# Patient Record
Sex: FEMALE | Race: ASIAN | Employment: PART TIME | URBAN - METROPOLITAN AREA
[De-identification: names, ages, dates, MRNs, and addresses within clinical notes are randomized per-mention and may not be internally consistent; named-entity substitution may affect disease eponyms.]

---

## 2018-05-01 ENCOUNTER — OFFICE VISIT (OUTPATIENT)
Dept: FAMILY MEDICINE CLINIC | Age: 53
End: 2018-05-01

## 2018-05-01 VITALS
WEIGHT: 123.8 LBS | BODY MASS INDEX: 24.3 KG/M2 | HEIGHT: 60 IN | OXYGEN SATURATION: 99 % | DIASTOLIC BLOOD PRESSURE: 64 MMHG | TEMPERATURE: 97.7 F | HEART RATE: 59 BPM | SYSTOLIC BLOOD PRESSURE: 116 MMHG

## 2018-05-01 DIAGNOSIS — L50.1 CHRONIC IDIOPATHIC URTICARIA: ICD-10-CM

## 2018-05-01 DIAGNOSIS — J30.89 CHRONIC NONSEASONAL ALLERGIC RHINITIS DUE TO POLLEN: ICD-10-CM

## 2018-05-01 DIAGNOSIS — Z78.0 MENOPAUSE: ICD-10-CM

## 2018-05-01 DIAGNOSIS — E78.2 MIXED HYPERLIPIDEMIA: ICD-10-CM

## 2018-05-01 DIAGNOSIS — E55.9 VITAMIN D DEFICIENCY: ICD-10-CM

## 2018-05-01 DIAGNOSIS — Z00.00 ROUTINE GENERAL MEDICAL EXAMINATION AT A HEALTH CARE FACILITY: Primary | ICD-10-CM

## 2018-05-01 DIAGNOSIS — Z00.00 ROUTINE GENERAL MEDICAL EXAMINATION AT A HEALTH CARE FACILITY: ICD-10-CM

## 2018-05-01 LAB
A/G RATIO: 1.9 (ref 1.1–2.2)
ALBUMIN SERPL-MCNC: 4.6 G/DL (ref 3.4–5)
ALP BLD-CCNC: 72 U/L (ref 40–129)
ALT SERPL-CCNC: 12 U/L (ref 10–40)
ANION GAP SERPL CALCULATED.3IONS-SCNC: 11 MMOL/L (ref 3–16)
AST SERPL-CCNC: 20 U/L (ref 15–37)
BASOPHILS ABSOLUTE: 0.1 K/UL (ref 0–0.2)
BASOPHILS RELATIVE PERCENT: 1.3 %
BILIRUB SERPL-MCNC: 0.6 MG/DL (ref 0–1)
BUN BLDV-MCNC: 18 MG/DL (ref 7–20)
CALCIUM SERPL-MCNC: 9.3 MG/DL (ref 8.3–10.6)
CHLORIDE BLD-SCNC: 103 MMOL/L (ref 99–110)
CHOLESTEROL, TOTAL: 203 MG/DL (ref 0–199)
CO2: 27 MMOL/L (ref 21–32)
CREAT SERPL-MCNC: 0.6 MG/DL (ref 0.6–1.1)
EOSINOPHILS ABSOLUTE: 0.2 K/UL (ref 0–0.6)
EOSINOPHILS RELATIVE PERCENT: 2.4 %
GFR AFRICAN AMERICAN: >60
GFR NON-AFRICAN AMERICAN: >60
GLOBULIN: 2.4 G/DL
GLUCOSE BLD-MCNC: 92 MG/DL (ref 70–99)
HCT VFR BLD CALC: 46.1 % (ref 36–48)
HDLC SERPL-MCNC: 76 MG/DL (ref 40–60)
HEMOGLOBIN: 15.3 G/DL (ref 12–16)
LDL CHOLESTEROL CALCULATED: 110 MG/DL
LYMPHOCYTES ABSOLUTE: 2.8 K/UL (ref 1–5.1)
LYMPHOCYTES RELATIVE PERCENT: 39.6 %
MCH RBC QN AUTO: 31.4 PG (ref 26–34)
MCHC RBC AUTO-ENTMCNC: 33.3 G/DL (ref 31–36)
MCV RBC AUTO: 94.3 FL (ref 80–100)
MONOCYTES ABSOLUTE: 0.6 K/UL (ref 0–1.3)
MONOCYTES RELATIVE PERCENT: 8 %
NEUTROPHILS ABSOLUTE: 3.4 K/UL (ref 1.7–7.7)
NEUTROPHILS RELATIVE PERCENT: 48.7 %
PDW BLD-RTO: 13.3 % (ref 12.4–15.4)
PLATELET # BLD: 267 K/UL (ref 135–450)
PMV BLD AUTO: 8.2 FL (ref 5–10.5)
POTASSIUM SERPL-SCNC: 5 MMOL/L (ref 3.5–5.1)
RBC # BLD: 4.89 M/UL (ref 4–5.2)
SODIUM BLD-SCNC: 141 MMOL/L (ref 136–145)
TOTAL PROTEIN: 7 G/DL (ref 6.4–8.2)
TRIGL SERPL-MCNC: 83 MG/DL (ref 0–150)
TSH SERPL DL<=0.05 MIU/L-ACNC: 1.78 UIU/ML (ref 0.27–4.2)
VITAMIN D 25-HYDROXY: 33 NG/ML
VLDLC SERPL CALC-MCNC: 17 MG/DL
WBC # BLD: 7.1 K/UL (ref 4–11)

## 2018-05-01 PROCEDURE — 99396 PREV VISIT EST AGE 40-64: CPT | Performed by: FAMILY MEDICINE

## 2018-05-01 ASSESSMENT — PATIENT HEALTH QUESTIONNAIRE - PHQ9
1. LITTLE INTEREST OR PLEASURE IN DOING THINGS: 0
SUM OF ALL RESPONSES TO PHQ9 QUESTIONS 1 & 2: 0
2. FEELING DOWN, DEPRESSED OR HOPELESS: 0
SUM OF ALL RESPONSES TO PHQ QUESTIONS 1-9: 0

## 2018-05-01 ASSESSMENT — ENCOUNTER SYMPTOMS
GASTROINTESTINAL NEGATIVE: 1
EYES NEGATIVE: 1
ALLERGIC/IMMUNOLOGIC NEGATIVE: 1
RESPIRATORY NEGATIVE: 1

## 2018-09-24 ENCOUNTER — OFFICE VISIT (OUTPATIENT)
Dept: FAMILY MEDICINE CLINIC | Age: 53
End: 2018-09-24
Payer: COMMERCIAL

## 2018-09-24 VITALS
HEART RATE: 52 BPM | BODY MASS INDEX: 24.94 KG/M2 | SYSTOLIC BLOOD PRESSURE: 110 MMHG | DIASTOLIC BLOOD PRESSURE: 70 MMHG | WEIGHT: 127 LBS | TEMPERATURE: 97.4 F | HEIGHT: 60 IN | RESPIRATION RATE: 12 BRPM

## 2018-09-24 DIAGNOSIS — K59.00 CONSTIPATION, UNSPECIFIED CONSTIPATION TYPE: ICD-10-CM

## 2018-09-24 DIAGNOSIS — K92.1 HEMATOCHEZIA: Primary | ICD-10-CM

## 2018-09-24 PROCEDURE — 99213 OFFICE O/P EST LOW 20 MIN: CPT | Performed by: FAMILY MEDICINE

## 2018-10-11 ENCOUNTER — OFFICE VISIT (OUTPATIENT)
Dept: FAMILY MEDICINE CLINIC | Age: 53
End: 2018-10-11
Payer: COMMERCIAL

## 2018-10-11 VITALS
HEIGHT: 60 IN | WEIGHT: 125.6 LBS | SYSTOLIC BLOOD PRESSURE: 116 MMHG | OXYGEN SATURATION: 99 % | TEMPERATURE: 97.4 F | RESPIRATION RATE: 14 BRPM | BODY MASS INDEX: 24.66 KG/M2 | HEART RATE: 54 BPM | DIASTOLIC BLOOD PRESSURE: 74 MMHG

## 2018-10-11 DIAGNOSIS — R00.2 HEART PALPITATIONS: ICD-10-CM

## 2018-10-11 DIAGNOSIS — Z00.00 WELL ADULT HEALTH CHECK: Primary | ICD-10-CM

## 2018-10-11 DIAGNOSIS — Z12.39 SCREENING FOR BREAST CANCER: ICD-10-CM

## 2018-10-11 PROCEDURE — 99396 PREV VISIT EST AGE 40-64: CPT | Performed by: FAMILY MEDICINE

## 2018-10-11 PROCEDURE — 93000 ELECTROCARDIOGRAM COMPLETE: CPT | Performed by: FAMILY MEDICINE

## 2018-10-11 ASSESSMENT — ENCOUNTER SYMPTOMS
SINUS PRESSURE: 0
COLOR CHANGE: 0
DIARRHEA: 0
EYE REDNESS: 0
SHORTNESS OF BREATH: 0
SORE THROAT: 0
NAUSEA: 0
COUGH: 0
VOMITING: 0

## 2019-10-10 ENCOUNTER — HOSPITAL ENCOUNTER (OUTPATIENT)
Dept: MAMMOGRAPHY | Age: 54
Discharge: HOME | End: 2019-10-10
Payer: COMMERCIAL

## 2019-10-10 DIAGNOSIS — Z12.31 VISIT FOR SCREENING MAMMOGRAM: ICD-10-CM

## 2019-10-10 PROCEDURE — 77063 BREAST TOMOSYNTHESIS BI: CPT

## 2019-10-24 ENCOUNTER — OFFICE VISIT (OUTPATIENT)
Dept: FAMILY MEDICINE CLINIC | Age: 54
End: 2019-10-24
Payer: COMMERCIAL

## 2019-10-24 VITALS
TEMPERATURE: 97.8 F | WEIGHT: 126 LBS | HEART RATE: 48 BPM | SYSTOLIC BLOOD PRESSURE: 92 MMHG | OXYGEN SATURATION: 98 % | BODY MASS INDEX: 24.74 KG/M2 | DIASTOLIC BLOOD PRESSURE: 66 MMHG | HEIGHT: 60 IN | RESPIRATION RATE: 13 BRPM

## 2019-10-24 DIAGNOSIS — E78.2 MIXED HYPERLIPIDEMIA: ICD-10-CM

## 2019-10-24 DIAGNOSIS — Z00.00 WELL ADULT HEALTH CHECK: Primary | ICD-10-CM

## 2019-10-24 PROCEDURE — 99396 PREV VISIT EST AGE 40-64: CPT | Performed by: FAMILY MEDICINE

## 2019-10-24 ASSESSMENT — ENCOUNTER SYMPTOMS
COUGH: 0
SINUS PRESSURE: 0
NAUSEA: 0
DIARRHEA: 0
SHORTNESS OF BREATH: 0
SORE THROAT: 0
VOMITING: 0
EYE REDNESS: 0
COLOR CHANGE: 0

## 2019-10-24 ASSESSMENT — PATIENT HEALTH QUESTIONNAIRE - PHQ9
1. LITTLE INTEREST OR PLEASURE IN DOING THINGS: 0
SUM OF ALL RESPONSES TO PHQ9 QUESTIONS 1 & 2: 0
2. FEELING DOWN, DEPRESSED OR HOPELESS: 0
SUM OF ALL RESPONSES TO PHQ QUESTIONS 1-9: 0
SUM OF ALL RESPONSES TO PHQ QUESTIONS 1-9: 0

## 2020-11-05 ENCOUNTER — OFFICE VISIT (OUTPATIENT)
Dept: FAMILY MEDICINE CLINIC | Age: 55
End: 2020-11-05
Payer: COMMERCIAL

## 2020-11-05 VITALS
SYSTOLIC BLOOD PRESSURE: 118 MMHG | HEART RATE: 68 BPM | BODY MASS INDEX: 23.56 KG/M2 | DIASTOLIC BLOOD PRESSURE: 74 MMHG | TEMPERATURE: 97.5 F | WEIGHT: 120 LBS | HEIGHT: 60 IN | OXYGEN SATURATION: 98 %

## 2020-11-05 PROBLEM — Z80.3 FAMILY HISTORY OF BREAST CANCER IN MOTHER: Status: ACTIVE | Noted: 2020-11-05

## 2020-11-05 PROCEDURE — 99396 PREV VISIT EST AGE 40-64: CPT | Performed by: FAMILY MEDICINE

## 2020-11-05 ASSESSMENT — ENCOUNTER SYMPTOMS
COLOR CHANGE: 0
NAUSEA: 0
DIARRHEA: 0
EYE REDNESS: 0
SHORTNESS OF BREATH: 0
COUGH: 0
SORE THROAT: 0
VOMITING: 0
SINUS PRESSURE: 0

## 2020-11-05 ASSESSMENT — PATIENT HEALTH QUESTIONNAIRE - PHQ9
SUM OF ALL RESPONSES TO PHQ9 QUESTIONS 1 & 2: 0
SUM OF ALL RESPONSES TO PHQ QUESTIONS 1-9: 0
1. LITTLE INTEREST OR PLEASURE IN DOING THINGS: 0
SUM OF ALL RESPONSES TO PHQ QUESTIONS 1-9: 0
2. FEELING DOWN, DEPRESSED OR HOPELESS: 0
SUM OF ALL RESPONSES TO PHQ QUESTIONS 1-9: 0

## 2020-11-05 NOTE — PROGRESS NOTES
11/5/2020    Riki Lora is a 63 y.o. 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 Has PPI to take PRN. Also started apple cider vinegar. Perimenopause  - maybe one day of bleeding in past 6 months. Mood  - no problems. Dealing well with stress at work    Sleep  - better recently, changed to a new mattress    Review of Systems   Constitutional: Negative for chills and fever. HENT: Negative for congestion, sinus pressure and sore throat. Eyes: Negative for redness and visual disturbance. Respiratory: Negative for cough and shortness of breath. Cardiovascular: Negative for chest pain and leg swelling. Gastrointestinal: Negative for diarrhea, nausea and vomiting. Genitourinary: Negative for difficulty urinating. Skin: Negative for color change and rash. Neurological: Negative for dizziness and headaches. Psychiatric/Behavioral: Negative for confusion. Prior to Visit Medications    Medication Sig Taking? Authorizing Provider   Omega-3 Fatty Acids (FISH OIL) 1000 MG CAPS Take 3,000 mg by mouth 3 times daily. Yes Historical Provider, MD   Calcium-Vitamin D-Vitamin K (CALCIUM + D + K) [phone] MG-UNT-MCG TABS Take 1 tablet by mouth daily.  Yes Historical Provider, MD   zoster recombinant adjuvanted vaccine [facility]) 50 MCG SUSR injection Give vaccine as directed  Patient not taking: Reported on 10/24/2019  Renata Morales MD     Past Medical History:   Diagnosis Date    Allergic rhinitis     spring fall    Chicken pox     Chronic idiopathic urticaria 1/14/2015     Family History   Problem Relation Age of Onset    Heart Disease Mother         patend ductus repair    Diabetes Mother     High Blood Pressure Mother     High Cholesterol Mother     Arthritis Mother     Breast Cancer Mother 80    Diabetes Father     High Cholesterol Father     High Blood Pressure Father     Stroke Maternal Grandmother     Cancer Maternal Grandfather         colon    Psoriasis Brother        LABS:  Lab Results   Component Value Date     05/01/2018    K 5.0 05/01/2018    CREATININE 0.6 05/01/2018     Cholesterol, Total   Date Value Ref Range Status   05/01/2018 203 (H) 0 - 199 mg/dL Final     LDL Calculated   Date Value Ref Range Status   05/01/2018 110 (H) <100 mg/dL Final     HDL   Date Value Ref Range Status   05/01/2018 76 (H) 40 - 60 mg/dL Final   10/12/2010 66 mg/dL Final     Comment:     Please note that the reference ranges have been  changed to reflect ranges supported by the  American Heart Association. Female  High Risk          <35 - 45  Moderate Risk       45 - 55  Average Risk        55 - 63  Low Risk            60 - 75  Very Low Risk           >75  Note: This interpretation is independent of other  risk factors. Triglycerides   Date Value Ref Range Status   05/01/2018 83 0 - 150 mg/dL Final     Lab Results   Component Value Date    ALT 12 05/01/2018    AST 20 05/01/2018    ALKPHOS 72 05/01/2018    BILITOT 0.6 05/01/2018     Lab Results   Component Value Date    WBC 7.1 05/01/2018    HGB 15.3 05/01/2018    HCT 46.1 05/01/2018    MCV 94.3 05/01/2018     05/01/2018     No results found for: LABA1C     PHYSICAL EXAM  /74   Pulse 68   Temp 97.5 °F (36.4 °C)   Ht 5' (1.524 m)   Wt 120 lb (54.4 kg)   SpO2 98%   BMI 23.44 kg/m²     BP Readings from Last 5 Encounters:   11/05/20 118/74   10/24/19 92/66   10/11/18 116/74   09/24/18 110/70   05/01/18 116/64       Wt Readings from Last 5 Encounters:   11/05/20 120 lb (54.4 kg)   10/24/19 126 lb (57.2 kg)   10/11/18 125 lb 9.6 oz (57 kg)   09/24/18 127 lb (57.6 kg)   05/01/18 123 lb 12.8 oz (56.2 kg)        Physical Exam  Constitutional:       Appearance: She is well-developed. HENT:      Head: Normocephalic and atraumatic.       Right Ear: Tympanic membrane normal.      Left Ear: Tympanic membrane normal.   Eyes:      Conjunctiva/sclera: Conjunctivae normal.   Neck:      Musculoskeletal: Normal range of motion and neck supple. Thyroid: No thyromegaly. Cardiovascular:      Rate and Rhythm: Normal rate and regular rhythm. Heart sounds: Normal heart sounds. No murmur. Pulmonary:      Effort: Pulmonary effort is normal.      Breath sounds: Normal breath sounds. No wheezing. Abdominal:      General: Bowel sounds are normal.      Palpations: Abdomen is soft. There is no mass. Tenderness: There is no abdominal tenderness. Musculoskeletal: Normal range of motion. Lymphadenopathy:      Cervical: No cervical adenopathy. Skin:     General: Skin is warm and dry. Findings: No rash. Comments: Normal turgor   Neurological:      Mental Status: She is alert and oriented to person, place, and time. Deep Tendon Reflexes: Reflexes normal.   Psychiatric:         Thought Content: Thought content normal.       ASSESSMENT/PLAN:  1. Well adult health check  Reviewed healthy lifestyle, sleep, stress management, preventive medicine for age  - LIPID PANEL; Future  - Comprehensive Metabolic Panel; Future  - TSH WITH REFLEX TO FT4; Future    2.  Family history of breast cancer in mother - age 80      Return in about 1 year (around 11/5/2021) for physical.

## 2020-12-08 ENCOUNTER — HOSPITAL ENCOUNTER (OUTPATIENT)
Dept: MAMMOGRAPHY | Age: 55
Discharge: HOME | End: 2020-12-08
Payer: COMMERCIAL

## 2020-12-08 PROCEDURE — 77063 BREAST TOMOSYNTHESIS BI: CPT

## 2020-12-10 ENCOUNTER — OFFICE VISIT (OUTPATIENT)
Dept: PRIMARY CARE CLINIC | Age: 55
End: 2020-12-10
Payer: COMMERCIAL

## 2020-12-10 PROCEDURE — 99211 OFF/OP EST MAY X REQ PHY/QHP: CPT | Performed by: NURSE PRACTITIONER

## 2020-12-11 LAB — SARS-COV-2, NAA: NOT DETECTED

## 2021-02-01 ENCOUNTER — OFFICE VISIT (OUTPATIENT)
Dept: PRIMARY CARE CLINIC | Age: 56
End: 2021-02-01
Payer: COMMERCIAL

## 2021-02-01 DIAGNOSIS — Z20.822 SUSPECTED COVID-19 VIRUS INFECTION: Primary | ICD-10-CM

## 2021-02-01 PROCEDURE — 99211 OFF/OP EST MAY X REQ PHY/QHP: CPT | Performed by: NURSE PRACTITIONER

## 2021-02-01 NOTE — PROGRESS NOTES
Alexandra Mansfield received a viral test for COVID-19. They were educated on isolation and quarantine as appropriate. For any symptoms, they were directed to seek care from their PCP, given contact information to establish with a doctor, directed to an urgent care or the emergency room.

## 2021-02-02 LAB — SARS-COV-2, NAA: NOT DETECTED

## 2022-11-30 ENCOUNTER — HOSPITAL ENCOUNTER (OUTPATIENT)
Dept: MAMMOGRAPHY | Age: 57
Discharge: HOME | End: 2022-11-30
Payer: COMMERCIAL

## 2022-11-30 ENCOUNTER — OFFICE VISIT (OUTPATIENT)
Dept: FAMILY MEDICINE CLINIC | Age: 57
End: 2022-11-30
Payer: COMMERCIAL

## 2022-11-30 VITALS
DIASTOLIC BLOOD PRESSURE: 78 MMHG | WEIGHT: 126 LBS | TEMPERATURE: 98.1 F | HEIGHT: 60 IN | HEART RATE: 60 BPM | SYSTOLIC BLOOD PRESSURE: 108 MMHG | OXYGEN SATURATION: 98 % | BODY MASS INDEX: 24.74 KG/M2

## 2022-11-30 VITALS — BODY MASS INDEX: 24.74 KG/M2 | HEIGHT: 60 IN | WEIGHT: 126 LBS

## 2022-11-30 DIAGNOSIS — Z00.00 WELL ADULT HEALTH CHECK: ICD-10-CM

## 2022-11-30 DIAGNOSIS — Z12.31 VISIT FOR SCREENING MAMMOGRAM: ICD-10-CM

## 2022-11-30 DIAGNOSIS — Z82.0 FAMILY HISTORY OF ALZHEIMER'S DISEASE: ICD-10-CM

## 2022-11-30 DIAGNOSIS — Z00.00 WELL ADULT HEALTH CHECK: Primary | ICD-10-CM

## 2022-11-30 LAB
A/G RATIO: 1.7 (ref 1.1–2.2)
ALBUMIN SERPL-MCNC: 4.5 G/DL (ref 3.4–5)
ALP BLD-CCNC: 85 U/L (ref 40–129)
ALT SERPL-CCNC: 12 U/L (ref 10–40)
ANION GAP SERPL CALCULATED.3IONS-SCNC: 11 MMOL/L (ref 3–16)
AST SERPL-CCNC: 19 U/L (ref 15–37)
BILIRUB SERPL-MCNC: 0.5 MG/DL (ref 0–1)
BUN BLDV-MCNC: 16 MG/DL (ref 7–20)
CALCIUM SERPL-MCNC: 9.5 MG/DL (ref 8.3–10.6)
CHLORIDE BLD-SCNC: 103 MMOL/L (ref 99–110)
CHOLESTEROL, TOTAL: 235 MG/DL (ref 0–199)
CO2: 26 MMOL/L (ref 21–32)
CREAT SERPL-MCNC: 0.6 MG/DL (ref 0.6–1.1)
GFR SERPL CREATININE-BSD FRML MDRD: >60 ML/MIN/{1.73_M2}
GLUCOSE BLD-MCNC: 89 MG/DL (ref 70–99)
HDLC SERPL-MCNC: 62 MG/DL (ref 40–60)
LDL CHOLESTEROL CALCULATED: 150 MG/DL
POTASSIUM SERPL-SCNC: 4.5 MMOL/L (ref 3.5–5.1)
SODIUM BLD-SCNC: 140 MMOL/L (ref 136–145)
TOTAL PROTEIN: 7.1 G/DL (ref 6.4–8.2)
TRIGL SERPL-MCNC: 116 MG/DL (ref 0–150)
TSH SERPL DL<=0.05 MIU/L-ACNC: 2.11 UIU/ML (ref 0.27–4.2)
VITAMIN B-12: 393 PG/ML (ref 211–911)
VITAMIN D 25-HYDROXY: 55.1 NG/ML
VLDLC SERPL CALC-MCNC: 23 MG/DL

## 2022-11-30 PROCEDURE — 77067 SCR MAMMO BI INCL CAD: CPT

## 2022-11-30 PROCEDURE — 99396 PREV VISIT EST AGE 40-64: CPT | Performed by: FAMILY MEDICINE

## 2022-11-30 ASSESSMENT — PATIENT HEALTH QUESTIONNAIRE - PHQ9
1. LITTLE INTEREST OR PLEASURE IN DOING THINGS: 0
2. FEELING DOWN, DEPRESSED OR HOPELESS: 0
SUM OF ALL RESPONSES TO PHQ QUESTIONS 1-9: 0
SUM OF ALL RESPONSES TO PHQ9 QUESTIONS 1 & 2: 0

## 2022-11-30 NOTE — PROGRESS NOTES
11/30/2022    This is a 61 y.o. female   Chief Complaint   Patient presents with    Annual Exam     Yearly check    Other     Wanted to ask about weight gain, is postmenopausal and has put on a few lbs. Nasal Congestion     Runny nose and sore throat since yesterday, did Covid test was negative. HPI    Works as a Pediatrician at [employer]    Occasional word-finding and focus issues  Gets 5-7 hours of sleep per night  Walks the dog 3-4 x per week    Mom was diagnosed with Alzheimer's recently. She lives in [facility] [place] is from. This is increased stress    She is putting on a few pounds of weight. Since menopause has noticed redistribution of body weight more centrally    Review of Systems     Current Outpatient Medications   Medication Sig Dispense Refill    Omega-3 Fatty Acids (FISH OIL) 1000 MG CAPS Take 3,000 mg by mouth 3 times daily. Calcium-Vitamin D-Vitamin K (CALCIUM + D + K) [phone] MG-UNT-MCG TABS Take 1 tablet by mouth daily. zoster recombinant adjuvanted vaccine (SHINGRIX) 50 MCG SUSR injection Give vaccine as directed (Patient not taking: No sig reported) 1 each 0     No current facility-administered medications for this visit. /78 (Site: Right Upper Arm, Position: Sitting, Cuff Size: Medium Adult)   Pulse 60   Temp 98.1 °F (36.7 °C) (Oral)   Ht 5' (1.524 m)   Wt 126 lb (57.2 kg)   LMP 03/20/2018 (Approximate)   SpO2 98%   BMI 24.61 kg/m²     Physical Exam    Wt Readings from Last 3 Encounters:   11/30/22 126 lb (57.2 kg)   11/30/22 126 lb (57.2 kg)   11/05/20 120 lb (54.4 kg)     BP Readings from Last 3 Encounters:   11/30/22 108/78   11/05/20 118/74   10/24/19 92/66     Assessment/Plan:  1. Well adult health check  - Comprehensive Metabolic Panel; Future  - TSH; Future  - Lipid Panel; Future  - Vitamin D 25 Hydroxy; Future  - Vitamin B12; Future    2. Family history of Alzheimer's disease - mother age 80    Overall doing well. Reviewed healthy lifestyle. We discussed sleep, physical activity, stress reduction. We also discussed postmenopausal changes in body fat distribution.   We discussed her mother's Alzheimer's diagnosis    Return in about 1 year (around 11/30/2023) for physical.

## 2023-03-24 ENCOUNTER — OFFICE VISIT (OUTPATIENT)
Dept: FAMILY MEDICINE CLINIC | Age: 58
End: 2023-03-24
Payer: COMMERCIAL

## 2023-03-24 VITALS
HEART RATE: 69 BPM | HEIGHT: 60 IN | BODY MASS INDEX: 26.11 KG/M2 | OXYGEN SATURATION: 96 % | WEIGHT: 133 LBS | RESPIRATION RATE: 16 BRPM

## 2023-03-24 DIAGNOSIS — M54.42 ACUTE MIDLINE LOW BACK PAIN WITH LEFT-SIDED SCIATICA: Primary | ICD-10-CM

## 2023-03-24 PROCEDURE — 99213 OFFICE O/P EST LOW 20 MIN: CPT | Performed by: FAMILY MEDICINE

## 2023-03-24 RX ORDER — PREDNISONE 10 MG/1
TABLET ORAL
Qty: 20 TABLET | Refills: 0 | Status: SHIPPED | OUTPATIENT
Start: 2023-03-24

## 2023-03-24 SDOH — ECONOMIC STABILITY: FOOD INSECURITY: WITHIN THE PAST 12 MONTHS, THE FOOD YOU BOUGHT JUST DIDN'T LAST AND YOU DIDN'T HAVE MONEY TO GET MORE.: NEVER TRUE

## 2023-03-24 SDOH — ECONOMIC STABILITY: FOOD INSECURITY: WITHIN THE PAST 12 MONTHS, YOU WORRIED THAT YOUR FOOD WOULD RUN OUT BEFORE YOU GOT MONEY TO BUY MORE.: NEVER TRUE

## 2023-03-24 SDOH — ECONOMIC STABILITY: INCOME INSECURITY: HOW HARD IS IT FOR YOU TO PAY FOR THE VERY BASICS LIKE FOOD, HOUSING, MEDICAL CARE, AND HEATING?: NOT HARD AT ALL

## 2023-03-24 SDOH — ECONOMIC STABILITY: HOUSING INSECURITY
IN THE LAST 12 MONTHS, WAS THERE A TIME WHEN YOU DID NOT HAVE A STEADY PLACE TO SLEEP OR SLEPT IN A SHELTER (INCLUDING NOW)?: NO

## 2023-03-24 NOTE — PROGRESS NOTES
days, 1 tab for 2 days  Dispense: 20 tablet; Refill: 0  - [facility]    Symptoms consistent with a possible slipped disc. Treat with prednisone taper, since this is a recurrence after initial recovery a week or so ago, advised physical therapy.     Starting to show some signs of possible weakness in the left leg based on history and exam.  We discussed if symptoms are not improving with prednisone and physical therapy or if noticing any progressive weakness or strength changes we would proceed with an MRI lumbar spine

## 2023-03-29 ENCOUNTER — HOSPITAL ENCOUNTER (OUTPATIENT)
Dept: PHYSICAL THERAPY | Age: 58
Setting detail: THERAPIES SERIES
Discharge: HOME | End: 2023-03-29
Payer: COMMERCIAL

## 2023-03-29 PROCEDURE — 97110 THERAPEUTIC EXERCISES: CPT

## 2023-03-29 PROCEDURE — 97161 PT EVAL LOW COMPLEX 20 MIN: CPT

## 2023-03-29 NOTE — THERAPY EVALUATION
Tests- Standing   (C)= Srikanth's Criteria: 3/4 Positive tests or (+) Fortins sign with two additional (+) tests  Special Test Abnormal Findings   Rogelio's Sign                (C)                  []Neg   []Pos R   []Pos L      Standing Landmarks []Iliac Crests Equal   []R High  []L High  []PSIS Equal   []R High  []L High  []ASIS Equal   []R High  []L High     []Difficult to assess due to body habitus    Standing Flexion Test  (C) []Neg   []Pos R   []Pos L      March Test (Gillet's Test) []Neg   []Pos R   []Pos L      Sacral Sulci Test  []Neg   []Pos R   []Pos L          Special Tests- seated     Special Test Abnormal Findings   Seated pelvic landmarks (C) []Iliac Crests Equal   []R High   []L High  []PSIS Equal   []R High  []L High  []Difficult to assess due to body habitus   Sitting flexion test  []Neg   []Pos R   []Pos L      Hip IR PROM  []WNL []Pos R    []Pos L         Slump test/Dural tension  []Neg   []Pos R   [x]Pos L          Deep Tendon Reflexes     [x]All reflexes NT except as marked below  Abnormal Reflex Findings Left Right Comments   Abril's Reflex      Biceps (C5,6)      Brachioradialis (C6)      Triceps (C7)      Quadriceps (L3,4)     []Pendular x 3 R  []Pendular x 3 L   Achilles (S1,2)      Ankle clonus , # of beats      Babinski's reflex           Dermatomal Sensation   [x]All dermatomes WFL for light touch except as marked below  Abnormal Dermatome Findings Left Right   Anterior groin, 2-3 inches below ASIS (L1-L2)     Middle third anterior thigh (L3)     Patella and med malleolus (L4)     Fibular head and dorsum of foot (L5)     Lateral side and plantar surface of foot (S1)     Medial aspect of posterior thigh (S2) NT NT                 Range of Motion/Strength Testing-Myotomes    [x]All ROM NT except as marked below   [x]All strength NT except as marked below    [x]All myotomes NT except as marked below     Range Tested MMT/ Resisted PROM AROM Comments   *denotes pain Left Right Left Right

## 2023-03-29 NOTE — FLOWSHEET NOTE
Exercise Program:  3/29/23:   Supine figure 4, supine hip ABD in hooklying, bridges with band, sitting modified slump   Access Code: [email or web address]      Therapeutic Exercise:   [x] (64315) Provided verbal/tactile cueing for activities related to strengthening, flexibility, endurance, ROM for improvements in LE, proximal hip, and core control with self-care, mobility, lifting, ambulation. NMR:  [] (78712) Provided verbal/tactile cueing for activities related to improving balance, coordination, kinesthetic sense, posture, motor skill, proprioception to assist with LE, proximal hip, and core control in self-care, mobility, lifting, ambulation and eccentric single leg control. Therapeutic Activities:    [] (02805) Provided verbal/tactile cueing for activities related to improving balance, coordination, kinesthetic sense, posture, motor skill, proprioception and motor activation to allow for proper function of core, proximal hip and LE with self-care and ADLs and functional mobility. Gait Training:    [] (75867) Provided training and instruction to the patient for proper LE, core and proximal hip recruitment and positioning and eccentric body weight control with ambulation re-education including up and down stairs     Manual Treatments:  PROM / STM / Oscillations-Mobs:  G-I, II, III, IV (PA's, Inf., Post.)  [] (40900) Provided manual therapy to mobilize LE, proximal hip and/or LS spine soft tissue/joints for the purpose of modulating pain, promoting relaxation,  increasing ROM, reducing/eliminating soft tissue swelling/inflammation/restriction, improving soft tissue extensibility and allowing for proper ROM for normal function with self-care, mobility, lifting and ambulation.      Home Exercise Program:    [x] (25491) Reviewed/Progressed HEP activities related to strengthening, flexibility, endurance, ROM of core, proximal hip and LE for functional self-care, mobility, lifting and ambulation/stair navigation   []

## 2023-04-05 ENCOUNTER — APPOINTMENT (OUTPATIENT)
Dept: PHYSICAL THERAPY | Age: 58
End: 2023-04-05
Payer: COMMERCIAL

## 2023-04-05 ENCOUNTER — PATIENT MESSAGE (OUTPATIENT)
Dept: FAMILY MEDICINE CLINIC | Age: 58
End: 2023-04-05

## 2023-04-05 RX ORDER — NAPROXEN 500 MG/1
500 TABLET ORAL 2 TIMES DAILY WITH MEALS
Qty: 60 TABLET | Refills: 2 | Status: SHIPPED | OUTPATIENT
Start: 2023-04-05

## 2023-04-06 ENCOUNTER — HOSPITAL ENCOUNTER (OUTPATIENT)
Dept: PHYSICAL THERAPY | Age: 58
Setting detail: THERAPIES SERIES
Discharge: HOME | End: 2023-04-06
Payer: COMMERCIAL

## 2023-04-06 PROCEDURE — 97140 MANUAL THERAPY 1/> REGIONS: CPT

## 2023-04-06 PROCEDURE — 97110 THERAPEUTIC EXERCISES: CPT

## 2023-04-06 NOTE — FLOWSHEET NOTE
and down stairs     Manual Treatments:  PROM / STM / Oscillations-Mobs:  G-I, II, III, IV (PA's, Inf., Post.)  [x] (95436) Provided manual therapy to mobilize LE, proximal hip and/or LS spine soft tissue/joints for the purpose of modulating pain, promoting relaxation,  increasing ROM, reducing/eliminating soft tissue swelling/inflammation/restriction, improving soft tissue extensibility and allowing for proper ROM for normal function with self-care, mobility, lifting and ambulation. -ART to L piriformis with hip IR/ER with manual overpressure, with pt in R sidelying    Home Exercise Program:    [x] (12714) Reviewed/Progressed HEP activities related to strengthening, flexibility, endurance, ROM of core, proximal hip and LE for functional self-care, mobility, lifting and ambulation/stair navigation   [] (34944) Reviewed/Progressed HEP activities related to improving balance, coordination, kinesthetic sense, posture, motor skill, proprioception of core, proximal hip and LE for self-care, mobility, lifting, and ambulation/stair navigation      Modalities:    [] Electric Stimulation:   [] Ultrasound:   [] Other:       Charges:  Timed Code Treatment Minutes: TE: 37, Man: 8   Total Treatment Minutes: 45      [] EVAL (LOW) 52576 (typically 20 minutes face-to-face)  [] EVAL (MOD) 44781 (typically 30 minutes face-to-face)  [] EVAL (HIGH) 84824 (typically 45 minutes face-to-face)  [] RE-EVAL     [x] KR(60430) x   2    [] NMR (93845) x       [x] Manual (22881) x    1   [] TA (29552) x       [] Gait Training [phone] x       [] ES(attended) (14011)  [] ES (un) ([phone] [phone])   [] DRY NEEDLE 1 OR 2 MUSCLES  [] DRY NEEDLE 3+ MUSCLES  [] Mech Traction (54243)  [] Ultrasound (79590)  [] Other:      GOALS:  Patient stated goal: \"control pain\"  \"get strength back\"  [] Progressing: [] Met: [] Not Met: [] Adjusted     Therapist goals for Patient:   Short Term Goals: To be achieved in: 2 weeks  1.  Independent in HEP and progression per patient

## 2023-04-19 ENCOUNTER — HOSPITAL ENCOUNTER (OUTPATIENT)
Dept: PHYSICAL THERAPY | Age: 58
Setting detail: THERAPIES SERIES
Discharge: HOME | End: 2023-04-19
Payer: COMMERCIAL

## 2023-04-19 PROCEDURE — 97140 MANUAL THERAPY 1/> REGIONS: CPT

## 2023-04-19 PROCEDURE — 97110 THERAPEUTIC EXERCISES: CPT

## 2023-04-19 NOTE — FLOWSHEET NOTE
[facility]  [address] [place] [address], [address], [address]  Phone: [phone]   Fax: [phone]    Physical Therapy Treatment Note/ Progress Report:     Date:  2023     Patient Name:  Lucho Ryder    :  1965  MRN: [id number]    Medical Diagnosis Information:  Acute midline low back pain with left-sided sciatica [M54.42]  Treatment Diagnosis Information:  Treatment Diagnosis: L sided sciatica M54. 32  Insurance/Certification information:  PT Insurance Information: Karina Tatumn Required, $35 copay  Physician Information:  Jarad Vergara MD   Plan of care signed:    [x] Yes  [] No    Date of Patient follow up with Physician:      Progress Report: []  Yes  [x]  No   If Yes, Date Range for reporting period:  Beginning:  3/29/23  Ending:  NA    Progress report due (10 Rx/or 30 days whichever is less):      Recertification due (POC duration/ or 90 days whichever is less): 23    Visit # Insurance Allowable Auth Needed    8 visits to use 2023 - 2023  (US, TA, TE, NMR, MAN, Traction, E-Stim Unattended) [x]Yes   []No     RESTRICTIONS/PRECAUTIONS: None  Latex Allergy:  [x]NO      []YES  Preferred Language for Healthcare:   [x]English       []other:    Functional Scale: Modified Oswestry   Score: 16/50 = 32% impaired  Date assessed: 3/29/23    Pain level:  2/10 today,   5-6/10 over the weekend,    SUBJECTIVE:  Pt states she saw some friends this weekend and was doing a lot of sitting and this increased her pain over the weekend. Pt states she was having some spasms this morning in her back and she stretched them out and it felt better. Pt states the mornings are the hardest and she needs to move around and stretch. Pt states her L quad feels weak and tired throughout the day. She has had to stand at work the last 2 days d/t pain and relates it to the busy weekend.   She no longer gets nerve pain/symptoms down her leg and reports of

## 2023-04-26 ENCOUNTER — HOSPITAL ENCOUNTER (OUTPATIENT)
Dept: PHYSICAL THERAPY | Age: 58
Setting detail: THERAPIES SERIES
Discharge: HOME | End: 2023-04-26
Payer: COMMERCIAL

## 2023-04-26 PROCEDURE — 97110 THERAPEUTIC EXERCISES: CPT

## 2023-04-26 NOTE — PROGRESS NOTES
[facility]  [address] [place] [address], [address], [address]  Phone: [phone]   Fax: [phone]    Physical Therapy Treatment Note/ Progress Report:     Date:  2023     Patient Name:  Isacc Broussard    :  1965  MRN: [id number]    Medical Diagnosis Information:  Acute midline low back pain with left-sided sciatica [M54.42]  Treatment Diagnosis Information:  Treatment Diagnosis: L sided sciatica M54. 32  Insurance/Certification information:  PT Insurance Information: Whitney Christiansen Alee, $35 copay  Physician Information:  Shannen Mitchell MD   Plan of care signed:    [x] Yes  [] No    Date of Patient follow up with Physician:      Progress Report: [x]  Yes  []  No   If Yes, Date Range for reporting period:  Beginning:  3/29/23  Endin2023    Progress report due (10 Rx/or 30 days whichever is less):     Recertification due (POC duration/ or 90 days whichever is less): 23    Visit # Insurance Allowable Auth Needed    8 visits to use 2023 - 2023  (US, TA, TE, NMR, MAN, Traction, E-Stim Unattended) [x]Yes   []No     RESTRICTIONS/PRECAUTIONS: None  Latex Allergy:  [x]NO      []YES  Preferred Language for Healthcare:   [x]English       []other:    Functional Scale: Modified Oswestry   Score: 16/50 = 32% impaired  Date assessed: 3/29/23    Pain level:  3/10 today, -6/10    SUBJECTIVE:  Pt reports that Monday and Tuesday were great and had no pain. Today she has a little hip soreness. Pt reports that Saturday was a lot of pain and stiffness, relates it to being busy with phone calls and work and laying around. Pt reports she is still taking Naproxen, but is going to try and start weaning herself off the medication this week.     OBJECTIVE:               Range Tested MMT: IE MMT: 23    *denotes pain Left Right Left Right Left Right   Hip Flexion  (L1-2) 4-/5 5/5 4+/5        Hip Extension               Hip

## 2023-05-04 ENCOUNTER — HOSPITAL ENCOUNTER (OUTPATIENT)
Dept: PHYSICAL THERAPY | Age: 58
Setting detail: THERAPIES SERIES
Discharge: HOME | End: 2023-05-04

## 2023-05-04 NOTE — CARE COORDINATION
[facility] Outpatient [facility]  [address], [address] [facility], [address] [place] [address]  Phone: [phone]   Fax: [phone]    Physical Therapy Missed Visit Note     Date:  2023    Patient Name:  Avery Cota      :  1965    MRN: [id number]      Cancelled visits to date: 1  2023  No-shows to date: 0    For today's appointment patient:  [x]  Cancelled  []  Rescheduled appointment  []  No-show     Reason given by patient:  []  Patient ill  []  Conflicting appointment  []  No transportation    []  Conflict with work  [x]  No reason given  []  Other:     Comments:      Electronically signed by:   Mayte Andrews, [address] [facility], DPT [id number]

## 2023-05-10 ENCOUNTER — HOSPITAL ENCOUNTER (OUTPATIENT)
Dept: PHYSICAL THERAPY | Age: 58
Setting detail: THERAPIES SERIES
Discharge: HOME | End: 2023-05-10
Payer: COMMERCIAL

## 2023-05-10 PROCEDURE — 97110 THERAPEUTIC EXERCISES: CPT

## 2023-05-10 NOTE — THERAPY DISCHARGE
[facility] Outpatient [facility]  [address] [place] [address], [address]  Phone: [phone]   Fax: [phone]    Physical Therapy Treatment Note/ Progress Report:     Date:  05/10/2023     Patient Name:  Sabina Landrum    :  1965  MRN: [id number]    Medical Diagnosis Information:  Acute midline low back pain with left-sided sciatica [M54.42]  Treatment Diagnosis Information:  Treatment Diagnosis: L sided sciatica M54. 32  Insurance/Certification information:  PT Insurance Information: Cally Tomedwin Required, $35 copay  Physician Information:  Shankar Brown MD   Plan of care signed:    [x] Yes  [] No    Date of Patient follow up with Physician:      Progress Report: [x]  Yes  []  No   If Yes, Date Range for reporting period:  Beginning:  3/29/23  Endin/10/2023    Progress report due (10 Rx/or 30 days whichever is less): 3/84/9297    Recertification due (POC duration/ or 90 days whichever is less): 23    Visit # Insurance Allowable Auth Needed    8 visits to use 2023 - 2023  (US, TA, TE, NMR, MAN, Traction, E-Stim Unattended) [x]Yes   []No     RESTRICTIONS/PRECAUTIONS: None  Latex Allergy:  [x]NO      []YES  Preferred Language for Healthcare:   [x]English       []other:    Functional Scale: Modified Oswestry   Score: 0/50 = 0% impaired  Date assessed: 5/10/2023    Pain level:  no pain, just soreness    SUBJECTIVE:  Pt reports that she has not had any issues and has been feeling good. No nerve pain. Pt feels like she is back to normal. Pt reports she has been off the naprosen medication for over a week. Pt pain has been well controlled for several weeks.     OBJECTIVE:       L ankle DF: 5/5  L hip ABD: 4+/5  L hip flex: 5/5      Functional Scale:    [] LEFS   [] ABC Scale  [] UEFI/UEFS  [] Functional Gait Index  [x] Modified Oswestry  [] VALLE  [] NDI    [] LLIS  [] [address]    [] Other:     Initial Score (% disability):  32% impaired  Discharge Score (%

## 2023-05-17 ENCOUNTER — APPOINTMENT (OUTPATIENT)
Dept: PHYSICAL THERAPY | Age: 58
End: 2023-05-17
Payer: COMMERCIAL

## 2023-05-24 ENCOUNTER — APPOINTMENT (OUTPATIENT)
Dept: PHYSICAL THERAPY | Age: 58
End: 2023-05-24
Payer: COMMERCIAL

## 2023-12-03 ASSESSMENT — PATIENT HEALTH QUESTIONNAIRE - PHQ9
SUM OF ALL RESPONSES TO PHQ9 QUESTIONS 1 & 2: 0
SUM OF ALL RESPONSES TO PHQ QUESTIONS 1-9: 0
2. FEELING DOWN, DEPRESSED OR HOPELESS: 0
1. LITTLE INTEREST OR PLEASURE IN DOING THINGS: 0
SUM OF ALL RESPONSES TO PHQ QUESTIONS 1-9: 0
1. LITTLE INTEREST OR PLEASURE IN DOING THINGS: NOT AT ALL
2. FEELING DOWN, DEPRESSED OR HOPELESS: NOT AT ALL
SUM OF ALL RESPONSES TO PHQ9 QUESTIONS 1 & 2: 0

## 2023-12-06 ENCOUNTER — OFFICE VISIT (OUTPATIENT)
Dept: FAMILY MEDICINE CLINIC | Age: 58
End: 2023-12-06
Payer: COMMERCIAL

## 2023-12-06 VITALS
HEART RATE: 55 BPM | DIASTOLIC BLOOD PRESSURE: 60 MMHG | HEIGHT: 60 IN | TEMPERATURE: 97.7 F | BODY MASS INDEX: 25.29 KG/M2 | WEIGHT: 128.8 LBS | OXYGEN SATURATION: 98 % | SYSTOLIC BLOOD PRESSURE: 112 MMHG | RESPIRATION RATE: 18 BRPM

## 2023-12-06 DIAGNOSIS — E55.9 VITAMIN D DEFICIENCY: ICD-10-CM

## 2023-12-06 DIAGNOSIS — Z00.00 WELL ADULT HEALTH CHECK: Primary | ICD-10-CM

## 2023-12-06 DIAGNOSIS — Z00.00 WELL ADULT HEALTH CHECK: ICD-10-CM

## 2023-12-06 DIAGNOSIS — E78.2 MIXED HYPERLIPIDEMIA: ICD-10-CM

## 2023-12-06 LAB
25(OH)D3 SERPL-MCNC: 67.2 NG/ML
ALBUMIN SERPL-MCNC: 4.7 G/DL (ref 3.4–5)
ALBUMIN/GLOB SERPL: 1.9 {RATIO} (ref 1.1–2.2)
ALP SERPL-CCNC: 89 U/L (ref 40–129)
ALT SERPL-CCNC: 10 U/L (ref 10–40)
ANION GAP SERPL CALCULATED.3IONS-SCNC: 10 MMOL/L (ref 3–16)
AST SERPL-CCNC: 18 U/L (ref 15–37)
BILIRUB SERPL-MCNC: 0.5 MG/DL (ref 0–1)
BUN SERPL-MCNC: 16 MG/DL (ref 7–20)
CALCIUM SERPL-MCNC: 9.5 MG/DL (ref 8.3–10.6)
CHLORIDE SERPL-SCNC: 103 MMOL/L (ref 99–110)
CHOLEST SERPL-MCNC: 207 MG/DL (ref 0–199)
CO2 SERPL-SCNC: 26 MMOL/L (ref 21–32)
CREAT SERPL-MCNC: 0.6 MG/DL (ref 0.6–1.1)
DEPRECATED RDW RBC AUTO: 13 % (ref 12.4–15.4)
GFR SERPLBLD CREATININE-BSD FMLA CKD-EPI: >60 ML/MIN/{1.73_M2}
GLUCOSE SERPL-MCNC: 101 MG/DL (ref 70–99)
HCT VFR BLD AUTO: 44.9 % (ref 36–48)
HDLC SERPL-MCNC: 62 MG/DL (ref 40–60)
HGB BLD-MCNC: 15.3 G/DL (ref 12–16)
LDLC SERPL CALC-MCNC: 122 MG/DL
MCH RBC QN AUTO: 32.1 PG (ref 26–34)
MCHC RBC AUTO-ENTMCNC: 34.1 G/DL (ref 31–36)
MCV RBC AUTO: 94 FL (ref 80–100)
PLATELET # BLD AUTO: 257 K/UL (ref 135–450)
PMV BLD AUTO: 8.7 FL (ref 5–10.5)
POTASSIUM SERPL-SCNC: 4.5 MMOL/L (ref 3.5–5.1)
PROT SERPL-MCNC: 7.2 G/DL (ref 6.4–8.2)
RBC # BLD AUTO: 4.78 M/UL (ref 4–5.2)
SODIUM SERPL-SCNC: 139 MMOL/L (ref 136–145)
TRIGL SERPL-MCNC: 113 MG/DL (ref 0–150)
TSH SERPL DL<=0.005 MIU/L-ACNC: 1.55 UIU/ML (ref 0.27–4.2)
VIT B12 SERPL-MCNC: 1432 PG/ML (ref 211–911)
VLDLC SERPL CALC-MCNC: 23 MG/DL
WBC # BLD AUTO: 6.8 K/UL (ref 4–11)

## 2023-12-06 PROCEDURE — 99396 PREV VISIT EST AGE 40-64: CPT | Performed by: FAMILY MEDICINE

## 2025-04-16 ENCOUNTER — RESULTS FOLLOW-UP (OUTPATIENT)
Dept: FAMILY MEDICINE CLINIC | Age: 60
End: 2025-04-16

## 2025-04-16 ENCOUNTER — HOSPITAL ENCOUNTER (OUTPATIENT)
Dept: MAMMOGRAPHY | Age: 60
Discharge: HOME | End: 2025-04-16
Payer: COMMERCIAL

## 2025-04-16 VITALS — BODY MASS INDEX: 25.13 KG/M2 | HEIGHT: 60 IN | WEIGHT: 128 LBS

## 2025-04-16 DIAGNOSIS — Z12.31 VISIT FOR SCREENING MAMMOGRAM: ICD-10-CM

## 2025-04-16 PROCEDURE — 77067 SCR MAMMO BI INCL CAD: CPT

## 2025-06-02 ENCOUNTER — PATIENT MESSAGE (OUTPATIENT)
Dept: FAMILY MEDICINE CLINIC | Age: 60
End: 2025-06-02

## 2025-06-02 DIAGNOSIS — R92.30 DENSE BREAST TISSUE ON MAMMOGRAM, UNSPECIFIED TYPE: ICD-10-CM

## 2025-06-02 DIAGNOSIS — Z12.39 ENCOUNTER FOR BREAST CANCER SCREENING USING NON-MAMMOGRAM MODALITY: ICD-10-CM

## 2025-06-02 DIAGNOSIS — Z91.89 INCREASED RISK OF BREAST CANCER: Primary | ICD-10-CM

## 2025-07-02 ENCOUNTER — HOSPITAL ENCOUNTER (OUTPATIENT)
Dept: MRI IMAGING | Age: 60
Discharge: HOME | End: 2025-07-02
Payer: COMMERCIAL

## 2025-07-02 DIAGNOSIS — Z12.39 ENCOUNTER FOR BREAST CANCER SCREENING USING NON-MAMMOGRAM MODALITY: ICD-10-CM

## 2025-07-02 DIAGNOSIS — Z91.89 INCREASED RISK OF BREAST CANCER: ICD-10-CM

## 2025-07-02 DIAGNOSIS — R92.30 DENSE BREAST TISSUE ON MAMMOGRAM, UNSPECIFIED TYPE: ICD-10-CM

## 2025-07-02 PROCEDURE — 6360000004 HC RX CONTRAST MEDICATION: Performed by: FAMILY MEDICINE

## 2025-07-02 PROCEDURE — A9579 GAD-BASE MR CONTRAST NOS,1ML: HCPCS | Performed by: FAMILY MEDICINE

## 2025-07-02 PROCEDURE — C8908 MRI W/O FOL W/CONT, BREAST,: HCPCS

## 2025-07-02 RX ORDER — GADOTERIDOL 279.3 MG/ML
12 INJECTION INTRAVENOUS
Status: COMPLETED | OUTPATIENT
Start: 2025-07-02 | End: 2025-07-02

## 2025-07-02 RX ADMIN — GADOTERIDOL 12 ML: 279.3 INJECTION, SOLUTION INTRAVENOUS at 11:12

## 2025-07-06 ENCOUNTER — RESULTS FOLLOW-UP (OUTPATIENT)
Dept: FAMILY MEDICINE CLINIC | Age: 60
End: 2025-07-06